# Patient Record
Sex: FEMALE | Race: OTHER | NOT HISPANIC OR LATINO | URBAN - METROPOLITAN AREA
[De-identification: names, ages, dates, MRNs, and addresses within clinical notes are randomized per-mention and may not be internally consistent; named-entity substitution may affect disease eponyms.]

---

## 2020-02-11 ENCOUNTER — EMERGENCY (EMERGENCY)
Facility: HOSPITAL | Age: 60
LOS: 1 days | Discharge: HOME | End: 2020-02-11
Attending: EMERGENCY MEDICINE
Payer: COMMERCIAL

## 2020-02-11 VITALS
HEART RATE: 87 BPM | OXYGEN SATURATION: 99 % | WEIGHT: 115.08 LBS | TEMPERATURE: 98 F | DIASTOLIC BLOOD PRESSURE: 77 MMHG | HEIGHT: 65 IN | SYSTOLIC BLOOD PRESSURE: 153 MMHG | RESPIRATION RATE: 18 BRPM

## 2020-02-11 VITALS
TEMPERATURE: 98 F | HEART RATE: 74 BPM | RESPIRATION RATE: 18 BRPM | OXYGEN SATURATION: 99 % | SYSTOLIC BLOOD PRESSURE: 131 MMHG | DIASTOLIC BLOOD PRESSURE: 78 MMHG

## 2020-02-11 LAB
ALBUMIN SERPL ELPH-MCNC: 4.4 G/DL — SIGNIFICANT CHANGE UP (ref 3.3–5)
ALP SERPL-CCNC: 90 U/L — SIGNIFICANT CHANGE UP (ref 40–120)
ALT FLD-CCNC: 17 U/L — SIGNIFICANT CHANGE UP (ref 10–45)
ANION GAP SERPL CALC-SCNC: 12 MMOL/L — SIGNIFICANT CHANGE UP (ref 5–17)
AST SERPL-CCNC: 18 U/L — SIGNIFICANT CHANGE UP (ref 10–40)
BILIRUB SERPL-MCNC: 0.6 MG/DL — SIGNIFICANT CHANGE UP (ref 0.2–1.2)
BUN SERPL-MCNC: 20 MG/DL — SIGNIFICANT CHANGE UP (ref 7–23)
CALCIUM SERPL-MCNC: 9.5 MG/DL — SIGNIFICANT CHANGE UP (ref 8.4–10.5)
CHLORIDE SERPL-SCNC: 105 MMOL/L — SIGNIFICANT CHANGE UP (ref 96–108)
CO2 SERPL-SCNC: 24 MMOL/L — SIGNIFICANT CHANGE UP (ref 22–31)
CREAT SERPL-MCNC: 1.25 MG/DL — SIGNIFICANT CHANGE UP (ref 0.5–1.3)
GLUCOSE SERPL-MCNC: 106 MG/DL — HIGH (ref 70–99)
HCT VFR BLD CALC: 38.9 % — SIGNIFICANT CHANGE UP (ref 34.5–45)
HGB BLD-MCNC: 12.7 G/DL — SIGNIFICANT CHANGE UP (ref 11.5–15.5)
MCHC RBC-ENTMCNC: 30.8 PG — SIGNIFICANT CHANGE UP (ref 27–34)
MCHC RBC-ENTMCNC: 32.6 GM/DL — SIGNIFICANT CHANGE UP (ref 32–36)
MCV RBC AUTO: 94.2 FL — SIGNIFICANT CHANGE UP (ref 80–100)
NRBC # BLD: 0 /100 WBCS — SIGNIFICANT CHANGE UP (ref 0–0)
PLATELET # BLD AUTO: 222 K/UL — SIGNIFICANT CHANGE UP (ref 150–400)
POTASSIUM SERPL-MCNC: 3.9 MMOL/L — SIGNIFICANT CHANGE UP (ref 3.5–5.3)
POTASSIUM SERPL-SCNC: 3.9 MMOL/L — SIGNIFICANT CHANGE UP (ref 3.5–5.3)
PROT SERPL-MCNC: 7.3 G/DL — SIGNIFICANT CHANGE UP (ref 6–8.3)
RBC # BLD: 4.13 M/UL — SIGNIFICANT CHANGE UP (ref 3.8–5.2)
RBC # FLD: 13.1 % — SIGNIFICANT CHANGE UP (ref 10.3–14.5)
SODIUM SERPL-SCNC: 141 MMOL/L — SIGNIFICANT CHANGE UP (ref 135–145)
TROPONIN T, HIGH SENSITIVITY RESULT: <6 NG/L — SIGNIFICANT CHANGE UP (ref 0–51)
WBC # BLD: 4.43 K/UL — SIGNIFICANT CHANGE UP (ref 3.8–10.5)
WBC # FLD AUTO: 4.43 K/UL — SIGNIFICANT CHANGE UP (ref 3.8–10.5)

## 2020-02-11 PROCEDURE — 99285 EMERGENCY DEPT VISIT HI MDM: CPT

## 2020-02-11 PROCEDURE — 85027 COMPLETE CBC AUTOMATED: CPT

## 2020-02-11 PROCEDURE — 71046 X-RAY EXAM CHEST 2 VIEWS: CPT

## 2020-02-11 PROCEDURE — 71046 X-RAY EXAM CHEST 2 VIEWS: CPT | Mod: 26

## 2020-02-11 PROCEDURE — 84484 ASSAY OF TROPONIN QUANT: CPT

## 2020-02-11 PROCEDURE — 99284 EMERGENCY DEPT VISIT MOD MDM: CPT | Mod: 25

## 2020-02-11 PROCEDURE — 93010 ELECTROCARDIOGRAM REPORT: CPT | Mod: NC

## 2020-02-11 PROCEDURE — 80053 COMPREHEN METABOLIC PANEL: CPT

## 2020-02-11 PROCEDURE — 93005 ELECTROCARDIOGRAM TRACING: CPT

## 2020-02-11 NOTE — ED PROVIDER NOTE - ATTENDING CONTRIBUTION TO CARE
**ATTENDING ADDENDUM (Dr. Stephen Green): I attest that I have directly examined this patient and reviewed and formulated the diagnostic and therapeutic management plan in collaboration with the EM resident. Please see MDM note and remainder of EMR for findings from CC, HPI, ROS, and PE. (ADAN Hernandez)

## 2020-02-11 NOTE — ED PROVIDER NOTE - PROGRESS NOTE DETAILS
**ATTENDING ADDENDUM (Dr. Stephen Green): patient serially evaluated throughout ED course. NO acute deterioration up to this time in the ED. Personally reassessed. NO acute chest pain. Palpitations appear improved. NO acute shortness of breath or tachypnea noted. Will continue to observe and monitor closely. Anticipatory guidance provided. NO evidence of acute coronary syndrome, myocardial infarction, unstable angina, pulmonary embolism/deep venous thrombosis, pneumothorax, serious bacterial infection or sepsis/severe sepsis e.g. sammy/fatemeh/endocarditis, pneumonia, urinary tract infection, pyelonephritis, bacteremia or equivalent, vascular issues e.g. dissection or equivalent, or other worrisome etiologies for patient's symptoms. Will continue to observe and monitor closely.

## 2020-02-11 NOTE — ED PROVIDER NOTE - PLAN OF CARE
**ATTENDING ADDENDUM (Dr. Stephen Green): Goals of care include resolution of emergent/urgent symptoms and concerns, and restoration to baseline level of homeostasis.

## 2020-02-11 NOTE — ED PROVIDER NOTE - PHYSICAL EXAMINATION
gen: well appearing  Mentation: AAO x 3  psych: mood appropriate  ENT: airway patent  Eyes: conjunctivae clear bilaterally  Cardio: RRR, no m/r/g  Resp: normal BS b/l  GI: s/nt/nd   Neuro: sensation and motor function intact  Skin: No evidence of rash  MSK: normal movement of all extremities  Lymph/Vasc: no LE edema gen: well appearing  Mentation: AAO x 3  psych: mood appropriate  ENT: airway patent  Eyes: conjunctivae clear bilaterally  Cardio: RRR, no m/r/g  Resp: normal BS b/l  GI: s/nt/nd   Neuro: sensation and motor function intact  Skin: No evidence of rash  MSK: normal movement of all extremities  Lymph/Vasc: no LE edema  **ATTENDING ADDENDUM (Dr. Stephen Green): I have reviewed and substantially contributed to the elements of the PE as documented above. I have directly performed an examination of this patient in conjunction with the other members (EM resident/PA/NP) of the patient care team.

## 2020-02-11 NOTE — ED PROVIDER NOTE - DURATION
**ATTENDING ADDENDUM (Dr. Stephen Green): reports intermittent episodes, last was two weeks ago./today

## 2020-02-11 NOTE — ED PROVIDER NOTE - PATIENT PORTAL LINK FT
You can access the FollowMyHealth Patient Portal offered by [facility] by registering at the following website: [email or web address]. By joining [employer]’s FollowMyHealth portal, you will also be able to view your health information using other applications (apps) compatible with our system.

## 2020-02-11 NOTE — ED ADULT NURSE NOTE - OBJECTIVE STATEMENT
60 y/o female presents to ED reporting CP. Pt reports CP woke pt up from sleep and goes to L arm, pt endorses L arm numbness. Pt reports these episodes have happened the past few days, endorses feeling SOB today.  reports pt had three episodes of vomiting today and recent CT scan.  also reports pt has hx of memory loss, and had extensive workup, all without significant findings. On exam, lung sounds CTA, NAD. Abdomen soft, non-tender, non-distended, normoactive bowel sounds. Pt denies diarrhea, fever/chills at this time. EKG completed and given to attending MD. CM in place NSR HR 90s. Heplock placed, labs sent. Seen and evaluated by MD. Awaiting imaging at this time.

## 2020-02-11 NOTE — ED PROVIDER NOTE - RADIATION
arm/**ATTENDING ADDENDUM (Dr. Stephen Green): NO radiation to the jaw, neck, shoulders, or back. POSITIVE left arm radiation.

## 2020-02-11 NOTE — ED PROVIDER NOTE - CLINICAL SUMMARY MEDICAL DECISION MAKING FREE TEXT BOX
60 y/o F with no cardiac history presenting for waking up from sleep with SOB, palpitations, and chest pain. Will do chest pain work up, DC home if negative as patient is low risk cardiac. - Waylon Hernandez, DO PGY-1 60 y/o F with no cardiac history presenting for waking up from sleep with SOB, palpitations, and chest pain. Will do chest pain work up, DC home if negative as patient is low risk cardiac. - Waylon Hernandez, DO PGY-1  **ATTENDING MEDICAL DECISION MAKING/SYNTHESIS (Dr. Stephen Green): I have reviewed the Chief Complaint, the HPI, the ROS, and have directly performed and confirmed the findings on the Physical Examination. I have reviewed the medical decision making with all providers, as applicable. The PROBLEM REPRESENTATION at this time is: 59-year-old woman with history of gastritis now presenting with the chief concern of chest tightness and shortness of breath with radiation of pain/discomfort to the left arm that awoke patient from sleep. Improved with time (much improved at time of arrival to the ED) and with supine positioning. NO movement-associated, pleuritic, reproducible, or exertional component to the symptoms. NO smoker. NO history of pulmonary embolism/deep venous thrombosis. Sharp, non-pleuritic, non-exertional pain. The MOST LIKELY DIAGNOSIS, and the LIST OF DIFFERENTIAL DIAGNOSES, includes (but is not limited to) the following: acute coronary syndrome e.g. NSTEMI, unstable angina, or equivalent, pulmonary embolism/deep venous thrombosis, pneumothorax, tamponade, Boerhaave's esophagus, acute surgical cause, serious bacterial infection or sepsis/severe sepsis e.g. pneumonia, empyema, or equivalent, perforation, peritonitis, dehydration, electrolyte-metabolic-endocrine derangements, vascular cause e.g. AAA, dissection or equivalent, gastritis, gastroenteritis. The likelihood of each of these diagnoses has been appropriately considered in the context of this patient's presentation and my evaluation. PLAN: as described in EMR, including diagnostics, therapeutics and consultation as clinically warranted. I will continue to reevaluate the patient, including the results of all testing, and monitor response to therapy throughout the patient's course in the ED.

## 2020-02-11 NOTE — ED PROVIDER NOTE - GASTROINTESTINAL, MLM
Abdomen soft, non-tender **ATTENDING ADDENDUM (Dr. Stephen Green): non-distended. NO guarding, rebound, or rigidity. NO pulsatile or non-pulsatile masses. NO hernias. NO obvious hepatosplenomegaly.

## 2020-02-11 NOTE — ED PROVIDER NOTE - CHIEF COMPLAINT
The patient is a 59y Female complaining of The patient is a 59y Female with history of erosive gastritis now presenting with the chief concern of acute palpitations and pressure and vomiting which awoke her from sleep.

## 2020-02-11 NOTE — ED ADULT NURSE REASSESSMENT NOTE - NS ED NURSE REASSESS COMMENT FT1
Pt resting quietly on stretcher with cardiac monitor in place showing NSR. Awaiting x-ray at this time. Safety and comfort measures provided, bed locked and in lowest position, side rails up for safety.

## 2020-02-11 NOTE — ED PROVIDER NOTE - MUSCULOSKELETAL, MLM
Spine appears normal, range of motion is not limited, no muscle or joint tenderness **ATTENDING ADDENDUM (Dr. Stephen Green): NO cords, soft-tissue swelling, calf tenderness or asymmetric appearance to the bilateral lower extremities.

## 2020-02-11 NOTE — ED PROVIDER NOTE - NS ED ROS FT
CONSTITUTIONAL: No fevers, no chills, no lightheadedness, no dizziness  Eyes: no visual changes  Ears: no ear drainage, no ear pain  Nose: no nasal congestion  Mouth/Throat: no sore throat  CV: +chest pain, palpitations  PULM: + SOB, no cough  GI: No n/v/d, no abd pain  : no dysuria, no hematuria  SKIN: no rashes.  NEURO: no headache, no focal weakness or numbness  LYMPH/VASC: no LE swelling CONSTITUTIONAL: No fevers, no chills, no lightheadedness, no dizziness  Eyes: no visual changes  Ears: no ear drainage, no ear pain  Nose: no nasal congestion  Mouth/Throat: no sore throat  CV: +chest pain, palpitations  PULM: + SOB, no cough  GI: No n/v/d, no abd pain  : no dysuria, no hematuria  SKIN: no rashes.  NEURO: no headache, no focal weakness or numbness  LYMPH/VASC: no LE swelling  **ATTENDING ADDENDUM (Dr. Stephen Green): During my interview with the patient, I have personally obtained and/or have directly verified the elements in the past medical/surgical history and other histories as noted earlier in the EMR,  in conjunction with the other members (EM resident/PA/NP) of the patient care team. I have also personally obtained and/or have directly verified/reviewed the review of systems as documented below, in conjunction with the other members (EM resident/PA/NP) of the patient care team.

## 2020-02-11 NOTE — ED PROVIDER NOTE - OBJECTIVE STATEMENT
58 y/o F with PMH of erosive gastritis presenting with sudden onset of chest pain radiating to left arm with associated shortness of breath, which woke her up from her sleep. Patient also had 3 episodes of vomiting. No nausea at this time. Patient states she had similar episode about 2 weeks ago and it resolved after laying down. Patient states she feels dizzy. Denies fever, cough, 58 y/o F with PMH of erosive gastritis presenting with sudden onset of chest pain radiating to left arm with associated shortness of breath, which woke her up from her sleep. Patient also had 3 episodes of vomiting. No nausea at this time. Patient states she had similar episode about 2 weeks ago and it resolved after laying down. Patient states she feels dizzy. Denies fever, cough,  **ATTENDING ADDENDUM (Dr. Stephen Green): I attest that I have directly and personally interviewed and examined this patient and elicited a comparable history of present illness and review of systems as documented, along with my EM resident. I attest that I have made significant contributions to the documentation where necessary and as noted in the EMR.

## 2020-02-11 NOTE — ED PROVIDER NOTE - CARE PLAN
Principal Discharge DX:	Palpitations Principal Discharge DX:	Palpitations  Goal:	**ATTENDING ADDENDUM (Dr. Stephen Green): Goals of care include resolution of emergent/urgent symptoms and concerns, and restoration to baseline level of homeostasis.

## 2020-02-11 NOTE — ED PROVIDER NOTE - EYES, MLM
Clear bilaterally, pupils equal, round and reactive to light. **ATTENDING ADDENDUM (Dr. Stephen Green): Extraocular muscle movements intact. Clear corneas bilaterally, pupils equal and round.

## 2020-02-11 NOTE — ED ADULT NURSE NOTE - NSIMPLEMENTINTERV_GEN_ALL_ED
Implemented All Universal Safety Interventions:  [place] to call system. Call bell, personal items and telephone within reach. Instruct patient to call for assistance. Room bathroom lighting operational. Non-slip footwear when patient is off stretcher. Physically safe environment: no spills, clutter or unnecessary equipment. Stretcher in lowest position, wheels locked, appropriate side rails in place.

## 2020-02-11 NOTE — ED PROVIDER NOTE - LAB INTERPRETATION
**ATTENDING ADDENDUM (Dr. Stephen Green): Labs reviewed. Pertinent findings include: NO severe or profound electrolyte-metabolic-endocrine derangements, anemia, or leukocytosis at this time.

## 2020-02-11 NOTE — ED PROVIDER NOTE - RESPIRATORY, MLM
Breath sounds clear and equal bilaterally. **ATTENDING ADDENDUM (Dr. Stephen Green): NO wheezing, rales, rhonchi, crackles, stridor, drooling, retractions, nasal flaring, or tripoding.

## 2020-02-12 NOTE — ED POST DISCHARGE NOTE - RESULT SUMMARY
Seun called asking that a note stating that his wife had been seen and evaluated yesterday be faxed to [phone] as well as him picking it up in person.  Spoke with pt, she agrees that these are her wishes.  Wrote note saying what was stated and nothing else.  Jerod